# Patient Record
Sex: MALE | Race: WHITE | NOT HISPANIC OR LATINO | Employment: FULL TIME | URBAN - METROPOLITAN AREA
[De-identification: names, ages, dates, MRNs, and addresses within clinical notes are randomized per-mention and may not be internally consistent; named-entity substitution may affect disease eponyms.]

---

## 2017-07-25 ENCOUNTER — OFFICE VISIT - HEALTHEAST (OUTPATIENT)
Dept: FAMILY MEDICINE | Facility: CLINIC | Age: 32
End: 2017-07-25

## 2017-07-25 DIAGNOSIS — K21.9 GERD (GASTROESOPHAGEAL REFLUX DISEASE): ICD-10-CM

## 2018-04-17 ENCOUNTER — OFFICE VISIT - HEALTHEAST (OUTPATIENT)
Dept: FAMILY MEDICINE | Facility: CLINIC | Age: 33
End: 2018-04-17

## 2018-04-17 DIAGNOSIS — Z11.3 SCREEN FOR STD (SEXUALLY TRANSMITTED DISEASE): ICD-10-CM

## 2018-04-17 LAB — HIV 1+2 AB+HIV1 P24 AG SERPL QL IA: NEGATIVE

## 2018-04-17 ASSESSMENT — MIFFLIN-ST. JEOR: SCORE: 1647.05

## 2018-04-18 LAB
C TRACH DNA SPEC QL PROBE+SIG AMP: NEGATIVE
HCV AB SERPL QL IA: NEGATIVE
N GONORRHOEA DNA SPEC QL NAA+PROBE: NEGATIVE
T PALLIDUM AB SER QL: NEGATIVE

## 2018-04-19 ENCOUNTER — COMMUNICATION - HEALTHEAST (OUTPATIENT)
Dept: FAMILY MEDICINE | Facility: CLINIC | Age: 33
End: 2018-04-19

## 2018-07-02 ENCOUNTER — RECORDS - HEALTHEAST (OUTPATIENT)
Dept: ADMINISTRATIVE | Facility: OTHER | Age: 33
End: 2018-07-02

## 2018-10-26 ENCOUNTER — AMBULATORY - HEALTHEAST (OUTPATIENT)
Dept: NURSING | Facility: CLINIC | Age: 33
End: 2018-10-26

## 2018-10-26 DIAGNOSIS — Z23 NEED FOR VACCINATION: ICD-10-CM

## 2019-07-11 ENCOUNTER — RECORDS - HEALTHEAST (OUTPATIENT)
Dept: ADMINISTRATIVE | Facility: OTHER | Age: 34
End: 2019-07-11

## 2019-09-25 ENCOUNTER — AMBULATORY - HEALTHEAST (OUTPATIENT)
Dept: NURSING | Facility: CLINIC | Age: 34
End: 2019-09-25

## 2020-03-17 ENCOUNTER — RECORDS - HEALTHEAST (OUTPATIENT)
Dept: ADMINISTRATIVE | Facility: OTHER | Age: 35
End: 2020-03-17

## 2020-11-14 ENCOUNTER — AMBULATORY - HEALTHEAST (OUTPATIENT)
Dept: NURSING | Facility: CLINIC | Age: 35
End: 2020-11-14

## 2021-04-10 ENCOUNTER — AMBULATORY - HEALTHEAST (OUTPATIENT)
Dept: NURSING | Facility: CLINIC | Age: 36
End: 2021-04-10

## 2021-05-27 ENCOUNTER — RECORDS - HEALTHEAST (OUTPATIENT)
Dept: ADMINISTRATIVE | Facility: CLINIC | Age: 36
End: 2021-05-27

## 2021-05-31 VITALS — WEIGHT: 161 LBS | BODY MASS INDEX: 22.45 KG/M2

## 2021-06-01 VITALS — HEIGHT: 71 IN | WEIGHT: 152.1 LBS | BODY MASS INDEX: 21.29 KG/M2

## 2021-06-12 NOTE — PROGRESS NOTES
ASSESSMENT:  1. GERD (gastroesophageal reflux disease)  Long-standing history of, generally controlled with Prilosec, less effective control with Zantac.      PLAN:  1.  I explained to the patient the pharmacologic differences between Zantac and Prilosec.  2.  Discussed that there are both benefits and risks to PPI therapy but I do think that there are potential benefits to him.  3.  Recommend that he take Prilosec daily however he does not need to do this consistently a long-term he could see if he needs to take it for a month or 2 then he can go off of that and use again if symptoms return.  4.  If he has breakthrough heartburn, he could take a Tums or Zantac on top of the Prilosec.    No orders of the defined types were placed in this encounter.    There are no discontinued medications.    No Follow-up on file.    CHIEF COMPLAINT:  Chief Complaint   Patient presents with     Gastroesophageal Reflux     acid reflux  in his stomach o/o takes zantac which helps , lives in [place]        SUBJECTIVE:  Cole is a 32 y.o. male who comes in for follow-up of his GERD.  Patient has a long-standing history of GERD and the patient's mother has had a Nissen fundoplication because of her GERD.  Patient has been on Prilosec in the past which has been helpful but he went off of that, meet as an example can really set off his heartburn, he is going back on Zantac but has not gotten full relief of his GERD symptoms.    I discussed with the patient that there are pharmacologic differences between Prilosec and Zantac, and that while there are certainly safety concerns about the Prilosec there are benefits as well but it does not sound like he needs to use it daily, discussed that this can be taken every other day or for periods of time and then it can be withdrawn and then restarted as symptoms warrant.  He generally does have good control of symptoms when he takes Prilosec daily but also discussed that he could use something on top  of that as needed.    REVIEW OF SYSTEMS:   Patient is in [place] mainly comes back regularly for work issues.  All other systems are negative.    PFSH:  Immunization History   Administered Date(s) Administered     Hep A, Adult 10/05/2016     Influenza, inj, historic 10/04/2012     Meningococcal MPSV4 08/24/2004     Tdap 10/25/2012     Typhoid, Inj, Inactive 10/05/2016     Social History     Social History     Marital status:      Spouse name: N/A     Number of children: 2     Years of education: N/A     Occupational History     Finance      Social History Main Topics     Smoking status: Never Smoker     Smokeless tobacco: Not on file     Alcohol use 1.8 oz/week     1 Standard drinks or equivalent, 2 Cans of beer per week      Comment: A couple beers per week     Drug use: Not on file     Sexual activity: Not on file     Other Topics Concern     Not on file     Social History Narrative    Diet- Regular, he avoids red meat, spicy food, or alcohol later in the evening.        Exercise- 4-5x per week. Boxing 3x per week and running or at-home work out the other two days.        Travels a lot to [place] for work. He is planning to move to [facility] for two years for work.     No past medical history on file.  Family History   Problem Relation Age of Onset     Hyperlipidemia Father      LAMBERT disease Maternal Grandmother      Heart disease Maternal Grandmother      LAMBERT disease Mother      Nissen     Brain cancer Brother      Glioblastoma     No Medical Problems Brother      Lung disease Paternal Grandmother        MEDICATIONS:  Current Outpatient Prescriptions   Medication Sig Dispense Refill     ascorbic acid, vitamin C, (VITAMIN C) 100 MG tablet Take 100 mg by mouth daily.       multivitamin,tx-minerals (MULTI-VITAMIN HP/MINERALS) cap Take by mouth.       OMEGA-3/DHA/EPA/FISH OIL (FISH OIL-OMEGA-3 FATTY ACIDS) 300-1,000 mg capsule Take 2 g by mouth daily.       ranitidine (ZANTAC) 75 MG tablet Take 75 mg by mouth daily.        No current facility-administered medications for this visit.        TOBACCO USE:  History   Smoking Status     Never Smoker   Smokeless Tobacco     Not on file       VITALS:  Vitals:    07/25/17 1542   BP: 110/66   Pulse: 70   Weight: 161 lb (73 kg)     Wt Readings from Last 3 Encounters:   07/25/17 161 lb (73 kg)   10/05/16 155 lb (70.3 kg)   02/04/15 148 lb (67.1 kg)       PHYSICAL EXAM:  Constitutional:   Reveals a healthy-appearing male vitals: per nursing notes.  Musculoskeletal: No peripheral swelling.  Neuro:  Alert and oriented. Cranial nerves, motor, sensory exams are intact.  No gross focal deficits.  Psychiatric:  Memory intact, mood appropriate.    QUALITY MEASURES:      DATA REVIEWED:

## 2021-06-17 NOTE — PROGRESS NOTES
"Assessment/Plan:     1. Screen for STD (sexually transmitted disease)  Labs pending.  Will call patient regarding results.  - Chlamydia trachomatis & Neisseria gonorrhoeae, Amplified Detection  - HIV Antigen/Antibody Screening Cascade  - Hepatitis C Antibody (Anti-HCV)  - Syphilis Screen, Cascade        Subjective:     Cole Telles is a 32 y.o. male who presents for STD screening.  Patient has not been in contact with any specific STDs that he is aware of.  He had unprotected sex this past January, outside of his marriage.  Denies any fever, weight loss, rash, abdominal pain, dysuria, hematuria, testicle pain/swelling, penile lesions, or discharge from his urethra.        The following portions of the patient's history were reviewed and updated as appropriate: allergies, current medications, past family history, past medical history, past social history, past surgical history and problem list.    Review of Systems  Pertinent items are noted in HPI.     Objective:     /60 (Patient Site: Right Arm, Patient Position: Sitting, Cuff Size: Adult Regular)  Pulse 60  Ht 5' 11\" (1.803 m)  Wt 152 lb 1.6 oz (69 kg)  BMI 21.21 kg/m2    General Appearance: Alert, cooperative, no distress, appears stated age      Lorene Kinney NP-C    "

## 2021-08-15 ENCOUNTER — HEALTH MAINTENANCE LETTER (OUTPATIENT)
Age: 36
End: 2021-08-15

## 2021-09-25 ENCOUNTER — IMMUNIZATION (OUTPATIENT)
Dept: FAMILY MEDICINE | Facility: CLINIC | Age: 36
End: 2021-09-25
Payer: COMMERCIAL

## 2021-09-25 PROCEDURE — 90686 IIV4 VACC NO PRSV 0.5 ML IM: CPT

## 2021-09-25 PROCEDURE — 90471 IMMUNIZATION ADMIN: CPT

## 2022-09-25 ENCOUNTER — HEALTH MAINTENANCE LETTER (OUTPATIENT)
Age: 37
End: 2022-09-25

## 2023-10-14 ENCOUNTER — HEALTH MAINTENANCE LETTER (OUTPATIENT)
Age: 38
End: 2023-10-14